# Patient Record
Sex: MALE | Race: WHITE | URBAN - METROPOLITAN AREA
[De-identification: names, ages, dates, MRNs, and addresses within clinical notes are randomized per-mention and may not be internally consistent; named-entity substitution may affect disease eponyms.]

---

## 2022-03-24 ENCOUNTER — OFFICE (OUTPATIENT)
Dept: URBAN - METROPOLITAN AREA CLINIC 4 | Facility: CLINIC | Age: 27
End: 2022-03-24

## 2022-03-24 VITALS — HEIGHT: 70 IN | WEIGHT: 233 LBS | DIASTOLIC BLOOD PRESSURE: 78 MMHG | SYSTOLIC BLOOD PRESSURE: 120 MMHG

## 2022-03-24 DIAGNOSIS — R15.2 FECAL URGENCY: ICD-10-CM

## 2022-03-24 DIAGNOSIS — R19.4 CHANGE IN BOWEL HABIT: ICD-10-CM

## 2022-03-24 DIAGNOSIS — R14.0 ABDOMINAL DISTENSION (GASEOUS): ICD-10-CM

## 2022-03-24 DIAGNOSIS — R19.7 DIARRHEA, UNSPECIFIED: ICD-10-CM

## 2022-03-24 DIAGNOSIS — R10.84 GENERALIZED ABDOMINAL PAIN: ICD-10-CM

## 2022-03-24 PROCEDURE — 99204 OFFICE O/P NEW MOD 45 MIN: CPT | Performed by: NURSE PRACTITIONER

## 2022-03-24 RX ORDER — DICYCLOMINE HYDROCHLORIDE 10 MG/1
30 CAPSULE ORAL
Qty: 90 | Refills: 5 | Status: ACTIVE
Start: 2022-03-24

## 2024-11-01 ENCOUNTER — OFFICE VISIT (OUTPATIENT)
Dept: INTERNAL MEDICINE | Facility: CLINIC | Age: 29
End: 2024-11-01
Payer: COMMERCIAL

## 2024-11-01 VITALS
HEIGHT: 70 IN | HEART RATE: 90 BPM | BODY MASS INDEX: 31.41 KG/M2 | SYSTOLIC BLOOD PRESSURE: 104 MMHG | TEMPERATURE: 97.3 F | DIASTOLIC BLOOD PRESSURE: 69 MMHG | OXYGEN SATURATION: 97 % | WEIGHT: 219.4 LBS

## 2024-11-01 DIAGNOSIS — Z23 NEED FOR INFLUENZA VACCINATION: ICD-10-CM

## 2024-11-01 DIAGNOSIS — Z11.59 ENCOUNTER FOR HEPATITIS C SCREENING TEST FOR LOW RISK PATIENT: ICD-10-CM

## 2024-11-01 DIAGNOSIS — J30.2 SEASONAL ALLERGIES: ICD-10-CM

## 2024-11-01 DIAGNOSIS — Z00.00 ENCOUNTER FOR MEDICAL EXAMINATION TO ESTABLISH CARE: Primary | ICD-10-CM

## 2024-11-01 PROCEDURE — 99203 OFFICE O/P NEW LOW 30 MIN: CPT

## 2024-11-01 PROCEDURE — 90471 IMMUNIZATION ADMIN: CPT

## 2024-11-01 PROCEDURE — 90656 IIV3 VACC NO PRSV 0.5 ML IM: CPT

## 2024-11-01 RX ORDER — DEXTROAMPHETAMINE SACCHARATE, AMPHETAMINE ASPARTATE, DEXTROAMPHETAMINE SULFATE, AND AMPHETAMINE SULFATE 5; 5; 5; 5 MG/1; MG/1; MG/1; MG/1
TABLET ORAL
COMMUNITY
Start: 2024-07-01

## 2024-11-01 RX ORDER — DEXTROAMPHETAMINE SACCHARATE, AMPHETAMINE ASPARTATE MONOHYDRATE, DEXTROAMPHETAMINE SULFATE AND AMPHETAMINE SULFATE 3.75; 3.75; 3.75; 3.75 MG/1; MG/1; MG/1; MG/1
25 CAPSULE, EXTENDED RELEASE ORAL EVERY MORNING
COMMUNITY
Start: 2024-08-09 | End: 2024-11-01 | Stop reason: SDUPTHER

## 2024-11-01 RX ORDER — DEXTROAMPHETAMINE SACCHARATE, AMPHETAMINE ASPARTATE, DEXTROAMPHETAMINE SULFATE AND AMPHETAMINE SULFATE 3.75; 3.75; 3.75; 3.75 MG/1; MG/1; MG/1; MG/1
20 TABLET ORAL DAILY
COMMUNITY
Start: 2024-07-18 | End: 2024-11-01 | Stop reason: SDUPTHER

## 2024-11-01 RX ORDER — DEXTROAMPHETAMINE SACCHARATE, AMPHETAMINE ASPARTATE MONOHYDRATE, DEXTROAMPHETAMINE SULFATE AND AMPHETAMINE SULFATE 6.25; 6.25; 6.25; 6.25 MG/1; MG/1; MG/1; MG/1
25 CAPSULE, EXTENDED RELEASE ORAL
COMMUNITY
Start: 2024-08-01

## 2024-11-01 NOTE — PATIENT INSTRUCTIONS
Continue medications as prescribed. Stay hydrated with water. Increase activity as tolerated. Labs in the future. Follow-up in 3 months for annual and fasting labs.

## 2024-11-01 NOTE — PROGRESS NOTES
"Chief Complaint  Establish Care and Allergies    Subjective        Elliot Ayan Bhat presents to [facility] PRIMARY [facility]ARE  History of Present Illness  29-year-old male presenting with seasonal allergies and to establish care.  Went to the [employer] for college and has been back in to [facility] for the past 2-1/2 years.  He is  and has 2 dogs.  Works as a  and helps make high-pressure torque tools.  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    Has seasonal allergies and occasionally takes Zyrtec.  Also takes occasionally a multivitamin.    Exercises routinely by walking his dogs.  Otherwise is on his feet 10 hours a day at work.    Sees Dr. Saldana for ADHD and is prescribed Adderall.  Since starting Adderall has had a 30 pound weight loss.  Weight is currently stabilizing due to high-protein and increased caloric intake.  Goal weight of 200 pounds.    Paternal side of family has alcohol abuse and drug abuse as well as heart history.  Maternal side of family has alcohol abuse, drug abuse and breast cancer history.      Was having irregular bowel movements but have since been improving.  Was previously checked by gastroenterology for Crohn's and celiac.        Objective   Vital Signs:  /69 (BP Location: Left arm, Patient Position: Sitting, Cuff Size: Adult)   Pulse 90   Temp 97.3 °F (36.3 °C) (Temporal)   Ht 177.8 cm (70\")   Wt 99.5 kg (219 lb 6.4 oz)   SpO2 97%   BMI 31.48 kg/m²   Estimated body mass index is 31.48 kg/m² as calculated from the following:    Height as of this encounter: 177.8 cm (70\").    Weight as of this encounter: 99.5 kg (219 lb 6.4 oz).       BMI is >= 30 and <35. (Class 1 Obesity). The following options were offered after discussion;: exercise counseling/recommendations and nutrition counseling/recommendations      Physical Exam  Vitals reviewed.   Constitutional:       Appearance: Normal appearance.   Musculoskeletal:         " General: Normal range of motion.      Cervical back: Normal range of motion.   Skin:     General: Skin is warm and dry.      Capillary Refill: Capillary refill takes less than 2 seconds.   Neurological:      General: No focal deficit present.      Mental Status: He is alert and oriented to person, place, and time.   Psychiatric:         Mood and Affect: Mood normal.         Behavior: Behavior normal.         Thought Content: Thought content normal.         Judgment: Judgment normal.        Result Review :            Current Outpatient Medications on File Prior to Visit   Medication Sig Dispense Refill    Adderall 20 MG tablet       amphetamine-dextroamphetamine XR (ADDERALL XR) 25 MG 24 hr capsule 1 capsule       No current facility-administered medications on file prior to visit.                Assessment and Plan     Diagnoses and all orders for this visit:    1. Encounter for medical examination to establish care (Primary)  -     Comprehensive Metabolic Panel; Future  -     Urinalysis With Microscopic If Indicated (No Culture) - Urine, Clean Catch; Future  -     Lipid Panel With / Chol / HDL Ratio; Future  -     TSH Rfx On Abnormal To Free T4; Future  -     CBC & Differential; Future    2. Seasonal allergies    3. Encounter for hepatitis C screening test for low risk patient  -     Hepatitis C Antibody; Future    4. Need for influenza vaccination  -     Fluzone >6mos        Patient Instructions   Continue medications as prescribed. Stay hydrated with water. Increase activity as tolerated. Labs in the future. Follow-up in 3 months for annual and fasting labs.            Follow Up     Return in about 3 months (around 2/1/2025) for Annual physical.  Patient was given instructions and counseling regarding his condition or for health maintenance advice. Please see specific information pulled into the AVS if appropriate.

## 2024-11-15 ENCOUNTER — TELEPHONE (OUTPATIENT)
Dept: INTERNAL MEDICINE | Facility: CLINIC | Age: 29
End: 2024-11-15
Payer: COMMERCIAL

## 2025-01-09 ENCOUNTER — OFFICE VISIT (OUTPATIENT)
Dept: INTERNAL MEDICINE | Facility: CLINIC | Age: 30
End: 2025-01-09
Payer: COMMERCIAL

## 2025-01-09 VITALS
TEMPERATURE: 97.1 F | DIASTOLIC BLOOD PRESSURE: 70 MMHG | WEIGHT: 212 LBS | SYSTOLIC BLOOD PRESSURE: 108 MMHG | OXYGEN SATURATION: 98 % | BODY MASS INDEX: 30.35 KG/M2 | HEART RATE: 87 BPM | HEIGHT: 70 IN

## 2025-01-09 DIAGNOSIS — J40 BRONCHITIS: ICD-10-CM

## 2025-01-09 DIAGNOSIS — H93.8X3 CONGESTION OF BOTH EARS: ICD-10-CM

## 2025-01-09 DIAGNOSIS — J06.9 UPPER RESPIRATORY TRACT INFECTION, UNSPECIFIED TYPE: Primary | ICD-10-CM

## 2025-01-09 LAB
EXPIRATION DATE: NORMAL
FLUAV AG UPPER RESP QL IA.RAPID: NOT DETECTED
FLUBV AG UPPER RESP QL IA.RAPID: NOT DETECTED
INTERNAL CONTROL: NORMAL
Lab: NORMAL
SARS-COV-2 AG UPPER RESP QL IA.RAPID: NOT DETECTED

## 2025-01-09 PROCEDURE — 87428 SARSCOV & INF VIR A&B AG IA: CPT

## 2025-01-09 PROCEDURE — 99213 OFFICE O/P EST LOW 20 MIN: CPT

## 2025-01-09 RX ORDER — METHYLPREDNISOLONE 4 MG/1
TABLET ORAL
Qty: 21 TABLET | Refills: 0 | Status: SHIPPED | OUTPATIENT
Start: 2025-01-09

## 2025-01-09 RX ORDER — AZITHROMYCIN 250 MG/1
TABLET, FILM COATED ORAL
Qty: 6 TABLET | Refills: 0 | Status: SHIPPED | OUTPATIENT
Start: 2025-01-09

## 2025-01-09 NOTE — PROGRESS NOTES
Chief Complaint  Fatigue (X5 days with some improvement ), Eye Pain (X5 days with some improvement ), Sore Throat (X5 days with no improvement ), Ear Fullness, and Cough (X5 days with no improvement )     Subjective:      History of Present Illness {CC  Problem List  Visit  Diagnosis   Encounters  Notes  Medications  Labs  Result Review Imaging  Media :23}     Elliot Bhat presents to [facility] for:    Patient or patient representative verbalized consent for the use of Ambient Listening during the visit with  FAITH Bautista for chart documentation. 1/22/2025  16:07 EST     History of Present Illness      The patient presents for evaluation of a cold.    He reports a gradual improvement in his symptoms but expresses concern over the prolonged duration of his cold, which he has never experienced before. He has not sought urgent care for this issue. His symptoms began last Saturday during a trip to [place] and have since fluctuated, with some improving and others worsening. He continues to experience rhinorrhea, pharyngeal discomfort, and a sensation of ear fullness. He has been expectorating green sputum, which he attributes to postnasal drip, although his nasal discharge remains clear. He experiences mild wheezing when supine at night and occasionally towards the end of the day.  His pharyngeal symptoms vary, sometimes improving and other times worsening. He has no history of recent lymphadenopathy or tenderness. He has a past medical history of streptococcal pharyngitis in his youth, which necessitated a tonsillectomy. He has abstained from Adderall for several days due to his current illness. He has been self-medicating with DayQuil and NyQuil but has avoided decongestants due to potential interactions with Adderall. He has been using allergy medications intermittently for the past few weeks.    ALLERGIES  The patient is allergic to  PENICILLINS.    MEDICATIONS  Current: Adderall (25 mg extended release in the morning and 20 mg release later in the afternoon)  Past: DayQuil, NyQuil         I have reviewed patient's medical history, any new submitted information provided by patient or medical assistant and updated medical record.      Objective:      Physical Exam  Vitals reviewed.   Constitutional:       General: He is awake. He is not in acute distress.     Appearance: Normal appearance. He is well-groomed. He is not ill-appearing, toxic-appearing or diaphoretic.   HENT:      Head: Normocephalic.      Right Ear: Hearing normal. Drainage and tenderness present. Tympanic membrane is not bulging. Tympanic membrane has normal mobility.      Left Ear: Hearing normal. Drainage and tenderness present. Tympanic membrane is not bulging. Tympanic membrane has normal mobility.      Nose: Nose normal. Congestion and rhinorrhea present.      Right Sinus: No maxillary sinus tenderness or frontal sinus tenderness.      Left Sinus: No maxillary sinus tenderness or frontal sinus tenderness.      Mouth/Throat:      Lips: Pink.      Mouth: Mucous membranes are moist.      Pharynx: Oropharynx is clear. Uvula midline.      Tonsils: No tonsillar exudate or tonsillar abscesses.   Eyes:      General: Lids are normal. Vision grossly intact. Allergic shiner present.      Conjunctiva/sclera: Conjunctivae normal.   Cardiovascular:      Rate and Rhythm: Normal rate and regular rhythm.      Heart sounds: Normal heart sounds.   Pulmonary:      Effort: Pulmonary effort is normal.      Breath sounds: No stridor or transmitted upper airway sounds. Examination of the right-lower field reveals decreased breath sounds. Examination of the left-lower field reveals decreased breath sounds. Decreased breath sounds present. No wheezing, rhonchi or rales.   Abdominal:      General: Bowel sounds are normal.   Lymphadenopathy:      Head:      Right side of head: No submental,  "submandibular or tonsillar adenopathy.      Left side of head: No submental, submandibular or tonsillar adenopathy.      Cervical: No cervical adenopathy.      Right cervical: No superficial, deep or posterior cervical adenopathy.     Left cervical: No superficial, deep or posterior cervical adenopathy.   Skin:     General: Skin is warm and dry.      Capillary Refill: Capillary refill takes less than 2 seconds.   Neurological:      General: No focal deficit present.      Mental Status: He is oriented to person, place, and time. Mental status is at baseline. He is lethargic.   Psychiatric:         Attention and Perception: Attention and perception normal.         Mood and Affect: Mood and affect normal.         Speech: Speech normal.         Behavior: Behavior normal. Behavior is cooperative.         Cognition and Memory: Cognition and memory normal.         Judgment: Judgment normal.        Result Review  Data Reviewed:{ Labs  Result Review  Imaging  Med Tab  Media :23}     Results  Laboratory Studies  Covid and flu tests are negative.       The following data was reviewed by: FAITH Bautista on 01/09/2025         POCT SARS-CoV-2 + Flu Antigen DENIS (01/09/2025 4:13 PM)       Vital Signs:   /70 (BP Location: Left arm, Patient Position: Sitting, Cuff Size: Adult)   Pulse 87   Temp 97.1 °F (36.2 °C) (Temporal)   Ht 177.8 cm (70\")   Wt 96.2 kg (212 lb)   SpO2 98%   BMI 30.42 kg/m²                   Requested Prescriptions     Signed Prescriptions Disp Refills    azithromycin (ZITHROMAX) 250 MG tablet 6 tablet 0     Sig: Take 2 tablets the first day, then 1 tablet daily for 4 days.    methylPREDNISolone (MEDROL) 4 MG dose pack 21 tablet 0     Sig: Take as directed on package instructions.       Routine medications provided by this office will also be refilled via pharmacy request.       Current Outpatient Medications:     Adderall 20 MG tablet, , Disp: , Rfl:     amphetamine-dextroamphetamine XR " (ADDERALL XR) 25 MG 24 hr capsule, 1 capsule, Disp: , Rfl:     azithromycin (ZITHROMAX) 250 MG tablet, Take 2 tablets the first day, then 1 tablet daily for 4 days., Disp: 6 tablet, Rfl: 0    methylPREDNISolone (MEDROL) 4 MG dose pack, Take as directed on package instructions., Disp: 21 tablet, Rfl: 0     Assessment and Plan:      Assessment and Plan {CC Problem List  Visit Diagnosis  ROS  Review (Popup)  [facility] Maintenance  Quality  BestPractice  Medications  SmartSets  SnapShot Encounters  Media :23}     Problem List Items Addressed This Visit    None  Visit Diagnoses       Upper respiratory tract infection, unspecified type    -  Primary    Relevant Medications    azithromycin (ZITHROMAX) 250 MG tablet    Congestion of both ears        Relevant Orders    POCT SARS-CoV-2 + Flu Antigen DENIS (Completed)    Bronchitis                     1. Upper respiratory infection.  COVID-19 and influenza tests returned negative results. The wheezing is likely due to inflammation and irritation rather than a chest infection. A Medrol Dosepak will be prescribed to manage inflammation and wheezing. Additionally, a Z-Blake will be provided to address the upper respiratory infection. He is advised to complete both courses of medication, even if symptoms improve. Potential side effects of the Medrol Dosepak, including increased appetite, irritability, and occasional heart palpitations, were discussed. He is encouraged to maintain adequate hydration during this period. The Z-Blake should be taken with food to prevent gastrointestinal upset.  Over-the-counter medications such as Mucinex or DayQuil can be used, but caution is advised when combining these with Tylenol or ibuprofen. If symptoms persist, a follow-up appointment will be scheduled for further evaluation, including a potential chest x-ray.    PROCEDURE  The patient underwent a tonsillectomy in the past due to streptococcal pharyngitis.   Upper Respiratory Infection      An upper respiratory infection is also called a common cold. It can affect your nose, throat, ears, and sinuses.   Common signs and symptoms include the following: Cold symptoms are usually worst for the first 3 to 5 days. You may have any of the following:  · Runny or stuffy nose  · Sneezing and coughing  · Sore throat or hoarseness  · Red, watery, and sore eyes  · Fatigue   · Chills and fever  · Headache, body aches, or sore muscles    Seek care immediately if:   · You have chest pain or trouble breathing.    Contact your healthcare provider if:   · You have a fever over 102ºF (39°C).  · Your sore throat gets worse or you see white or yellow spots in your throat.  · Your symptoms get worse after 3 to 5 days or your cold is not better in 14 days.  · You have a rash anywhere on your skin.  · You have large, tender lumps in your neck.  · You have thick, green or yellow drainage from your nose.  · You cough up thick yellow, green, or bloody mucus.  · You have vomiting for more than 24 hours and cannot keep fluids down.  · You have a bad earache.  · You have questions or concerns about your condition or care.    Treatment for a cold: There is no cure for the common cold. Colds are caused by viruses and do not get better with antibiotics. Most people get better in 7 to 14 days. You may continue to cough for 2 to 3 weeks. The following may help decrease your symptoms:  · Decongestants help reduce nasal congestion and help you breathe more easily. If you take decongestant pills, they may make you feel restless or not able to sleep. Do not use decongestant sprays for more than a few days.    · Cough suppressants help reduce coughing. Ask your healthcare provider which type of cough medicine is best for you.     · NSAIDs , such as ibuprofen, help decrease swelling, pain, and fever. NSAIDs can cause stomach bleeding or kidney problems in certain people. If you take blood thinner medicine, always ask your healthcare  provider if NSAIDs are safe for you. Always read the medicine label and follow directions.    · Acetaminophen decreases pain and fever. It is available without a doctor's order. Ask how much to take and how often to take it. Follow directions. Read the labels of all other medicines you are using to see if they also contain acetaminophen, or ask your doctor or pharmacist. Acetaminophen can cause liver damage if not taken correctly. Do not use more than 4 grams (4,000 milligrams) total of acetaminophen in one day.    Manage your cold:   · Rest as much as possible. Slowly start to do more each day.     · Drink more liquids as directed. Liquids will help thin and loosen mucus so you can cough it up. Liquids will also help prevent dehydration. Liquids that help prevent dehydration include water, fruit juice, and broth. Do not drink liquids that contain caffeine. Caffeine can increase your risk for dehydration. Ask your healthcare provider how much liquid to drink each day.     · Soothe a sore throat. Gargle with warm salt water. This helps your sore throat feel better. Make salt water by dissolving ¼ teaspoon salt in 1 cup warm water. You may also suck on hard candy or throat lozenges. You may use a sore throat spray.    · Use a humidifier or vaporizer. Use a cool mist humidifier or a vaporizer to increase air moisture in your home. This may make it easier for you to breathe and help decrease your cough.     · Use saline nasal drops as directed. These help relieve congestion.     · Apply petroleum-based jelly around the outside of your nostrils. This can decrease irritation from blowing your nose.     · Do not smoke. Nicotine and other chemicals in cigarettes and cigars can make your symptoms worse. They can also cause infections such as bronchitis or pneumonia. Ask your healthcare provider for information if you currently smoke and need help to quit. E-cigarettes or smokeless tobacco still contain nicotine. Talk to your  healthcare provider before you use these products.    Prevent spreading your cold to others:   · Try to stay away from other people during the first 2 to 3 days of your cold when it is more easily spread.   · Do not share food or drinks.   · Do not share hand towels with household members.  · Wash your hands often, especially after you blow your nose. Turn away from other people and cover your mouth and nose with a tissue when you sneeze or cough.     Thank you for allowing us to care for you,  FAITH Bautista      Follow Up {Instructions Charge Capture  Follow-up Communications :23}     No follow-ups on file.      Patient was given instructions and counseling regarding his condition or for health maintenance advice. Please see specific information pulled into the AVS if appropriate.        Dragon disclaimer:   Much of this encounter note is an electronic transcription/translation of spoken language to printed text. The electronic translation of spoken language may permit erroneous, or at times, nonsensical words or phrases to be inadvertently transcribed; Although I have reviewed the note for such errors, some may still exist.     Additional Patient Counseling:       Patient Instructions     Clinical References    Methylprednisolone Tablets  What is this medication?  METHYLPREDNISOLONE (meth ill pred NISS oh lone) treats many conditions such as asthma, allergic reactions, arthritis, inflammatory bowel diseases, adrenal, and blood or bone marrow disorders. It works by decreasing inflammation, slowing down an overactive immune system, or replacing cortisol normally made in the body. Cortisol is a hormone that plays an important role in how the body responds to stress, illness, and injury. It belongs to a group of medications called steroids.  This medicine may be used for other purposes; ask your health care provider or pharmacist if you have questions.  COMMON BRAND NAME(S): Medrol, Medrol Dosepak  What  should I tell my care team before I take this medication?  They need to know if you have any of these conditions:  Cushing's syndrome  Eye disease, vision problems  Diabetes  Glaucoma  Heart disease  High blood pressure  Infection especially a viral infection, such as chickenpox, cold sores, or herpes  Liver disease  Mental health conditions  Myasthenia gravis  Osteoporosis  Recent or upcoming vaccine  Seizures  Stomach or intestine problems  Thyroid disease  An unusual or allergic reaction to lactose, methylprednisolone, other medications, foods, dyes, or preservatives  Pregnant or trying to get pregnant  Breastfeeding  How should I use this medication?  Take this medication by mouth with a glass of water. Follow the directions on the prescription label. Take this medication with food. If you are taking this medication once a day, take it in the morning. Do not take it more often than directed. Do not suddenly stop taking your medication because you may develop a severe reaction. Your care team will tell you how much medication to take. If your care team wants you to stop the medication, the dose may be slowly lowered over time to avoid any side effects.  Talk to your care team about the use of this medication in children. Special care may be needed.  Overdosage: If you think you have taken too much of this medicine contact a poison control center or emergency room at once.  NOTE: This medicine is only for you. Do not share this medicine with others.  What if I miss a dose?  If you miss a dose, take it as soon as you can. If it is almost time for your next dose, talk to your care team. You may need to miss a dose or take an extra dose. Do not take double or extra doses without advice.  What may interact with this medication?  Do not take this medication with any of the following:  Alefacept  Echinacea  Live virus vaccines  Metyrapone  Mifepristone  This medication may also interact with the following:  Amphotericin  B  Aspirin and aspirin-like medications  Certain antibiotics, such as erythromycin, clarithromycin, troleandomycin  Certain medications for diabetes  Certain medications for fungal infections, such as ketoconazole  Certain medications for seizures, such as carbamazepine, phenobarbital, phenytoin  Certain medications that treat or prevent blood clots, such as warfarin  Cholestyramine  Cyclosporine  Digoxin  Diuretics  Estrogen or progestin hormones  Isoniazid  NSAIDs, medications for pain and inflammation, such as ibuprofen or naproxen  Other medications for myasthenia gravis  Rifampin  Vaccines  This list may not describe all possible interactions. Give your health care provider a list of all the medicines, herbs, non-prescription drugs, or dietary supplements you use. Also tell them if you smoke, drink alcohol, or use illegal drugs. Some items may interact with your medicine.  What should I watch for while using this medication?  Tell your care team if your symptoms do not start to get better or if they get worse. Do not stop taking except on your care team's advice. You may develop a severe reaction. Your care team will tell you how much medication to take.  This medication may increase your risk of getting an infection. Tell your care team if you are around anyone with measles or chickenpox, or if you develop sores or blisters that do not heal properly.  This medication may increase blood sugar levels. Ask your care team if changes in diet or medications are needed if you have diabetes.  Tell your care team right away if you have any change in your eyesight.  Using this medication for a long time may increase your risk of low bone mass. Talk to your care team about bone health.  What side effects may I notice from receiving this medication?  Side effects that you should report to your care team as soon as possible:  Allergic reactions--skin rash, itching, hives, swelling of the face, lips, tongue, or  throat  Cushing syndrome--increased fat around the midsection, upper back, neck, or face, pink or purple stretch marks on the skin, thinning, fragile skin that easily bruises, unexpected hair growth  High blood sugar (hyperglycemia)--increased thirst or amount of urine, unusual weakness or fatigue, blurry vision  Increase in blood pressure  Infection--fever, chills, cough, sore throat, wounds that don't heal, pain or trouble when passing urine, general feeling of discomfort or being unwell  Low adrenal gland function--nausea, vomiting, loss of appetite, unusual weakness or fatigue, dizziness  Mood and behavior changes--anxiety, nervousness, confusion, hallucinations, irritability, hostility, thoughts of suicide or self-harm, worsening mood, feelings of depression  Stomach bleeding--bloody or black, tar-like stools, vomiting blood or brown material that looks like coffee grounds  Swelling of the ankles, hands, or feet  Side effects that usually do not require medical attention (report to your care team if they continue or are bothersome):  Acne  General discomfort and fatigue  Headache  Increase in appetite  Nausea  Trouble sleeping  Weight gain  This list may not describe all possible side effects. Call your doctor for medical advice about side effects. You may report side effects to FDA at 6-043-FDA-3310.  Where should I keep my medication?  Keep out of the reach of children and pets.  Store at room temperature between 20 and 25 degrees C (68 and 77 degrees F). Throw away any unused medication after the expiration date.  NOTE: This sheet is a summary. It may not cover all possible information. If you have questions about this medicine, talk to your doctor, pharmacist, or health care provider.  © 2024 [employer] Standard (2023-08-16 00:00:00)  Azithromycin Tablets  What is this medication?  AZITHROMYCIN (az ith ady MYE sin) treats infections caused by bacteria. It belongs to a group of medications called  antibiotics. It will not treat colds, the flu, or infections caused by viruses.  This medicine may be used for other purposes; ask your health care provider or pharmacist if you have questions.  COMMON BRAND NAME(S): Zithromax, Zithromax Tri-Blake, Zithromax Z-Blake  What should I tell my care team before I take this medication?  They need to know if you have any of these conditions:  History of blood diseases, such as leukemia  History of irregular heartbeat  Kidney disease  Liver disease  Myasthenia gravis  An unusual or allergic reaction to azithromycin, other medications, foods, dyes, or preservatives  Pregnant or trying to get pregnant  Breastfeeding  How should I use this medication?  Take this medication by mouth with a full glass of water. Take it as directed on the prescription label. You can take it with food or on an empty stomach. If it upsets your stomach, take it with food. Take your medication at regular intervals. Do not take your medication more often than directed. Take all of your medication unless your care team tells you to stop it early. Keep taking it even if you think you are better.  Talk to your care team about the use of this medication in children. While it may be prescribed for children for selected conditions, precautions do apply.  Overdosage: If you think you have taken too much of this medicine contact a poison control center or emergency room at once.  NOTE: This medicine is only for you. Do not share this medicine with others.  What if I miss a dose?  If you miss a dose, take it as soon as you can. If it is almost time for your next dose, take only that dose. Do not take double or extra doses.  What may interact with this medication?  Do not take this medication with any of the following:  Cisapride  Dronedarone  Pimozide  Thioridazine  This medication may also interact with the following:  Antacids that contain aluminum or magnesium  Colchicine  Cyclosporine  Digoxin  Ergot alkaloids,  such as dihydroergotamine, ergotamine  Estrogen or progestin hormones  Nelfinavir  Other medications that cause heart rhythm change  Phenytoin  Warfarin  This list may not describe all possible interactions. Give your health care provider a list of all the medicines, herbs, non-prescription drugs, or dietary supplements you use. Also tell them if you smoke, drink alcohol, or use illegal drugs. Some items may interact with your medicine.  What should I watch for while using this medication?  Tell your care team if your symptoms do not start to get better or if they get worse.  This medication may cause serious skin reactions. They can happen weeks to months after starting the medication. Contact your care team right away if you notice fevers or flu-like symptoms with a rash. The rash may be red or purple and then turn into blisters or peeling of the skin. Or, you might notice a red rash with swelling of the face, lips or lymph nodes in your neck or under your arms.  Do not treat diarrhea with over the counter products. Contact your care team if you have diarrhea that lasts more than 2 days or if it is severe and watery.  This medication can make you more sensitive to the sun. Keep out of the sun. If you cannot avoid being in the sun, wear protective clothing and use sunscreen. Do not use sun lamps or tanning beds/booths.  What side effects may I notice from receiving this medication?  Side effects that you should report to your care team as soon as possible:  Allergic reactions or angioedema--skin rash, itching, hives, swelling of the face, eyes, lips, tongue, arms, or legs, trouble swallowing or breathing  Heart rhythm changes--fast or irregular heartbeat, dizziness, feeling faint or lightheaded, chest pain, trouble breathing  Liver injury--right upper belly pain, loss of appetite, nausea, light-colored stool, dark yellow or brown urine, yellowing skin or eyes, unusual weakness or fatigue  Rash, fever, and swollen  lymph nodes  Redness, blistering, peeling, or loosening of the skin, including inside the mouth  Severe diarrhea, fever  Unusual vaginal discharge, itching, or odor  Side effects that usually do not require medical attention (report to your care team if they continue or are bothersome):  Diarrhea  Nausea  Stomach pain  Vomiting  This list may not describe all possible side effects. Call your doctor for medical advice about side effects. You may report side effects to FDA at 7-933-EUV-9251.  Where should I keep my medication?  Keep out of the reach of children and pets.  Store at room temperature between 15 and 30 degrees C (59 and 86 degrees F). Throw away any unused medication after the expiration date.  NOTE: This sheet is a summary. It may not cover all possible information. If you have questions about this medicine, talk to your doctor, pharmacist, or health care provider.  © 2024 [employer] (2023-09-08 00:00:00)  Upper Respiratory Infection, Adult  An upper respiratory infection (URI) affects the nose, throat, and upper airways that lead to the lungs. The most common type of URI is often called the common cold. URIs usually get better on their own, without medical treatment.  What are the causes?  A URI is caused by a germ (virus). You may catch these germs by:  Breathing in droplets from an infected person's cough or sneeze.  Touching something that has the germ on it (is contaminated) and then touching your mouth, nose, or eyes.  What increases the risk?  You are more likely to get a URI if:  You are very young or very old.  You have close contact with others, such as at work, school, or a health care facility.  You smoke.  You have long-term (chronic) heart or lung disease.  You have a weakened disease-fighting system (immune system).  You have nasal allergies or asthma.  You have a lot of stress.  You have poor nutrition.  What are the signs or symptoms?  Runny or stuffy (congested)  nose.  Cough.  Sneezing.  Sore throat.  Headache.  Feeling tired (fatigue).  Fever.  Not wanting to eat as much as usual.  Pain in your forehead, behind your eyes, and over your cheekbones (sinus pain).  Muscle aches.  Redness or irritation of the eyes.  Pressure in the ears or face.  How is this treated?  URIs usually get better on their own within 7-10 days. Medicines cannot cure URIs, but your doctor may recommend certain medicines to help relieve symptoms, such as:  Over-the-counter cold medicines.  Medicines to reduce coughing (cough suppressants). Coughing is a type of defense against infection that helps to clear the nose, throat, windpipe, and lungs (respiratory system). Take these medicines only as told by your doctor.  Medicines to lower your fever.  Follow these instructions at home:  Activity  Rest as needed.  If you have a fever, stay home from work or school until your fever is gone, or until your doctor says you may return to work or school.  You should stay home until you cannot spread the infection anymore (you are not contagious).  Your doctor may have you wear a face mask so you have less risk of spreading the infection.  Relieving symptoms  Rinse your mouth often with salt water. To make salt water, dissolve ½-1 tsp (3-6 g) of salt in 1 cup (237 mL) of warm water.  Use a cool-mist humidifier to add moisture to the air. This can help you breathe more easily.  Eating and drinking    Drink enough fluid to keep your pee (urine) pale yellow.  Eat soups and other clear broths.  General instructions    Take over-the-counter and prescription medicines only as told by your doctor.  Do not smoke or use any products that contain nicotine or tobacco. If you need help quitting, ask your doctor.  Avoid being where people are smoking (avoid secondhand smoke).  Stay up to date on all your shots (immunizations), and get the flu shot every year.  Keep all follow-up visits.  How to prevent the spread of infection  to others    Wash your hands with soap and water for at least 20 seconds. If you cannot use soap and water, use hand .  Avoid touching your mouth, face, eyes, or nose.  Cough or sneeze into a tissue or your sleeve or elbow. Do not cough or sneeze into your hand or into the air.  Contact a doctor if:  You are getting worse, not better.  You have any of these:  A fever or chills.  Brown or red mucus in your nose.  Yellow or brown fluid (discharge)coming from your nose.  Pain in your face, especially when you bend forward.  Swollen neck glands.  Pain when you swallow.  White areas in the back of your throat.  Get help right away if:  You have shortness of breath that gets worse.  You have very bad or constant:  Headache.  Ear pain.  Pain in your forehead, behind your eyes, and over your cheekbones (sinus pain).  Chest pain.  You have long-lasting (chronic) lung disease along with any of these:  Making high-pitched whistling sounds when you breathe, most often when you breathe out (wheezing).  Long-lasting cough (more than 14 days).  Coughing up blood.  A change in your usual mucus.  You have a stiff neck.  You have changes in your:  Vision.  Hearing.  Thinking.  Mood.  These symptoms may be an emergency. Get help right away. Call 911.  Do not wait to see if the symptoms will go away.  Do not drive yourself to the hospital.  Summary  An upper respiratory infection (URI) is caused by a germ (virus). The most common type of URI is often called the common cold.  URIs usually get better within 7-10 days.  Take over-the-counter and prescription medicines only as told by your doctor.  This information is not intended to replace advice given to you by your health care provider. Make sure you discuss any questions you have with your health care provider.  Document Revised: 07/20/2022 Document Reviewed: 07/20/2022

## 2025-01-23 NOTE — PATIENT INSTRUCTIONS
Clinical References    Methylprednisolone Tablets  What is this medication?  METHYLPREDNISOLONE (meth ill pred NISS oh lone) treats many conditions such as asthma, allergic reactions, arthritis, inflammatory bowel diseases, adrenal, and blood or bone marrow disorders. It works by decreasing inflammation, slowing down an overactive immune system, or replacing cortisol normally made in the body. Cortisol is a hormone that plays an important role in how the body responds to stress, illness, and injury. It belongs to a group of medications called steroids.  This medicine may be used for other purposes; ask your health care provider or pharmacist if you have questions.  COMMON BRAND NAME(S): Medrol, Medrol Dosepak  What should I tell my care team before I take this medication?  They need to know if you have any of these conditions:  Cushing's syndrome  Eye disease, vision problems  Diabetes  Glaucoma  Heart disease  High blood pressure  Infection especially a viral infection, such as chickenpox, cold sores, or herpes  Liver disease  Mental health conditions  Myasthenia gravis  Osteoporosis  Recent or upcoming vaccine  Seizures  Stomach or intestine problems  Thyroid disease  An unusual or allergic reaction to lactose, methylprednisolone, other medications, foods, dyes, or preservatives  Pregnant or trying to get pregnant  Breastfeeding  How should I use this medication?  Take this medication by mouth with a glass of water. Follow the directions on the prescription label. Take this medication with food. If you are taking this medication once a day, take it in the morning. Do not take it more often than directed. Do not suddenly stop taking your medication because you may develop a severe reaction. Your care team will tell you how much medication to take. If your care team wants you to stop the medication, the dose may be slowly lowered over time to avoid any side effects.  Talk to your care team about the use of this  medication in children. Special care may be needed.  Overdosage: If you think you have taken too much of this medicine contact a poison control center or emergency room at once.  NOTE: This medicine is only for you. Do not share this medicine with others.  What if I miss a dose?  If you miss a dose, take it as soon as you can. If it is almost time for your next dose, talk to your care team. You may need to miss a dose or take an extra dose. Do not take double or extra doses without advice.  What may interact with this medication?  Do not take this medication with any of the following:  Alefacept  Echinacea  Live virus vaccines  Metyrapone  Mifepristone  This medication may also interact with the following:  Amphotericin B  Aspirin and aspirin-like medications  Certain antibiotics, such as erythromycin, clarithromycin, troleandomycin  Certain medications for diabetes  Certain medications for fungal infections, such as ketoconazole  Certain medications for seizures, such as carbamazepine, phenobarbital, phenytoin  Certain medications that treat or prevent blood clots, such as warfarin  Cholestyramine  Cyclosporine  Digoxin  Diuretics  Estrogen or progestin hormones  Isoniazid  NSAIDs, medications for pain and inflammation, such as ibuprofen or naproxen  Other medications for myasthenia gravis  Rifampin  Vaccines  This list may not describe all possible interactions. Give your health care provider a list of all the medicines, herbs, non-prescription drugs, or dietary supplements you use. Also tell them if you smoke, drink alcohol, or use illegal drugs. Some items may interact with your medicine.  What should I watch for while using this medication?  Tell your care team if your symptoms do not start to get better or if they get worse. Do not stop taking except on your care team's advice. You may develop a severe reaction. Your care team will tell you how much medication to take.  This medication may increase your risk  of getting an infection. Tell your care team if you are around anyone with measles or chickenpox, or if you develop sores or blisters that do not heal properly.  This medication may increase blood sugar levels. Ask your care team if changes in diet or medications are needed if you have diabetes.  Tell your care team right away if you have any change in your eyesight.  Using this medication for a long time may increase your risk of low bone mass. Talk to your care team about bone health.  What side effects may I notice from receiving this medication?  Side effects that you should report to your care team as soon as possible:  Allergic reactions--skin rash, itching, hives, swelling of the face, lips, tongue, or throat  Cushing syndrome--increased fat around the midsection, upper back, neck, or face, pink or purple stretch marks on the skin, thinning, fragile skin that easily bruises, unexpected hair growth  High blood sugar (hyperglycemia)--increased thirst or amount of urine, unusual weakness or fatigue, blurry vision  Increase in blood pressure  Infection--fever, chills, cough, sore throat, wounds that don't heal, pain or trouble when passing urine, general feeling of discomfort or being unwell  Low adrenal gland function--nausea, vomiting, loss of appetite, unusual weakness or fatigue, dizziness  Mood and behavior changes--anxiety, nervousness, confusion, hallucinations, irritability, hostility, thoughts of suicide or self-harm, worsening mood, feelings of depression  Stomach bleeding--bloody or black, tar-like stools, vomiting blood or brown material that looks like coffee grounds  Swelling of the ankles, hands, or feet  Side effects that usually do not require medical attention (report to your care team if they continue or are bothersome):  Acne  General discomfort and fatigue  Headache  Increase in appetite  Nausea  Trouble sleeping  Weight gain  This list may not describe all possible side effects. Call your  doctor for medical advice about side effects. You may report side effects to FDA at 5-707-WPR-5558.  Where should I keep my medication?  Keep out of the reach of children and pets.  Store at room temperature between 20 and 25 degrees C (68 and 77 degrees F). Throw away any unused medication after the expiration date.  NOTE: This sheet is a summary. It may not cover all possible information. If you have questions about this medicine, talk to your doctor, pharmacist, or health care provider.  © 2024 [employer]/Gold Standard (2023-08-16 00:00:00)  Azithromycin Tablets  What is this medication?  AZITHROMYCIN (az ith ady MYE sin) treats infections caused by bacteria. It belongs to a group of medications called antibiotics. It will not treat colds, the flu, or infections caused by viruses.  This medicine may be used for other purposes; ask your health care provider or pharmacist if you have questions.  COMMON BRAND NAME(S): Zithromax, Zithromax Tri-Blake, Zithromax Z-Blake  What should I tell my care team before I take this medication?  They need to know if you have any of these conditions:  History of blood diseases, such as leukemia  History of irregular heartbeat  Kidney disease  Liver disease  Myasthenia gravis  An unusual or allergic reaction to azithromycin, other medications, foods, dyes, or preservatives  Pregnant or trying to get pregnant  Breastfeeding  How should I use this medication?  Take this medication by mouth with a full glass of water. Take it as directed on the prescription label. You can take it with food or on an empty stomach. If it upsets your stomach, take it with food. Take your medication at regular intervals. Do not take your medication more often than directed. Take all of your medication unless your care team tells you to stop it early. Keep taking it even if you think you are better.  Talk to your care team about the use of this medication in children. While it may be prescribed for children for  selected conditions, precautions do apply.  Overdosage: If you think you have taken too much of this medicine contact a poison control center or emergency room at once.  NOTE: This medicine is only for you. Do not share this medicine with others.  What if I miss a dose?  If you miss a dose, take it as soon as you can. If it is almost time for your next dose, take only that dose. Do not take double or extra doses.  What may interact with this medication?  Do not take this medication with any of the following:  Cisapride  Dronedarone  Pimozide  Thioridazine  This medication may also interact with the following:  Antacids that contain aluminum or magnesium  Colchicine  Cyclosporine  Digoxin  Ergot alkaloids, such as dihydroergotamine, ergotamine  Estrogen or progestin hormones  Nelfinavir  Other medications that cause heart rhythm change  Phenytoin  Warfarin  This list may not describe all possible interactions. Give your health care provider a list of all the medicines, herbs, non-prescription drugs, or dietary supplements you use. Also tell them if you smoke, drink alcohol, or use illegal drugs. Some items may interact with your medicine.  What should I watch for while using this medication?  Tell your care team if your symptoms do not start to get better or if they get worse.  This medication may cause serious skin reactions. They can happen weeks to months after starting the medication. Contact your care team right away if you notice fevers or flu-like symptoms with a rash. The rash may be red or purple and then turn into blisters or peeling of the skin. Or, you might notice a red rash with swelling of the face, lips or lymph nodes in your neck or under your arms.  Do not treat diarrhea with over the counter products. Contact your care team if you have diarrhea that lasts more than 2 days or if it is severe and watery.  This medication can make you more sensitive to the sun. Keep out of the sun. If you cannot avoid  being in the sun, wear protective clothing and use sunscreen. Do not use sun lamps or tanning beds/booths.  What side effects may I notice from receiving this medication?  Side effects that you should report to your care team as soon as possible:  Allergic reactions or angioedema--skin rash, itching, hives, swelling of the face, eyes, lips, tongue, arms, or legs, trouble swallowing or breathing  Heart rhythm changes--fast or irregular heartbeat, dizziness, feeling faint or lightheaded, chest pain, trouble breathing  Liver injury--right upper belly pain, loss of appetite, nausea, light-colored stool, dark yellow or brown urine, yellowing skin or eyes, unusual weakness or fatigue  Rash, fever, and swollen lymph nodes  Redness, blistering, peeling, or loosening of the skin, including inside the mouth  Severe diarrhea, fever  Unusual vaginal discharge, itching, or odor  Side effects that usually do not require medical attention (report to your care team if they continue or are bothersome):  Diarrhea  Nausea  Stomach pain  Vomiting  This list may not describe all possible side effects. Call your doctor for medical advice about side effects. You may report side effects to FDA at 6-149-FDA-8750.  Where should I keep my medication?  Keep out of the reach of children and pets.  Store at room temperature between 15 and 30 degrees C (59 and 86 degrees F). Throw away any unused medication after the expiration date.  NOTE: This sheet is a summary. It may not cover all possible information. If you have questions about this medicine, talk to your doctor, pharmacist, or health care provider.  © 2024 [employer] (2023-09-08 00:00:00)  Upper Respiratory Infection, Adult  An upper respiratory infection (URI) affects the nose, throat, and upper airways that lead to the lungs. The most common type of URI is often called the common cold. URIs usually get better on their own, without medical treatment.  What are the causes?  A  URI is caused by a germ (virus). You may catch these germs by:  Breathing in droplets from an infected person's cough or sneeze.  Touching something that has the germ on it (is contaminated) and then touching your mouth, nose, or eyes.  What increases the risk?  You are more likely to get a URI if:  You are very young or very old.  You have close contact with others, such as at work, school, or a health care facility.  You smoke.  You have long-term (chronic) heart or lung disease.  You have a weakened disease-fighting system (immune system).  You have nasal allergies or asthma.  You have a lot of stress.  You have poor nutrition.  What are the signs or symptoms?  Runny or stuffy (congested) nose.  Cough.  Sneezing.  Sore throat.  Headache.  Feeling tired (fatigue).  Fever.  Not wanting to eat as much as usual.  Pain in your forehead, behind your eyes, and over your cheekbones (sinus pain).  Muscle aches.  Redness or irritation of the eyes.  Pressure in the ears or face.  How is this treated?  URIs usually get better on their own within 7-10 days. Medicines cannot cure URIs, but your doctor may recommend certain medicines to help relieve symptoms, such as:  Over-the-counter cold medicines.  Medicines to reduce coughing (cough suppressants). Coughing is a type of defense against infection that helps to clear the nose, throat, windpipe, and lungs (respiratory system). Take these medicines only as told by your doctor.  Medicines to lower your fever.  Follow these instructions at home:  Activity  Rest as needed.  If you have a fever, stay home from work or school until your fever is gone, or until your doctor says you may return to work or school.  You should stay home until you cannot spread the infection anymore (you are not contagious).  Your doctor may have you wear a face mask so you have less risk of spreading the infection.  Relieving symptoms  Rinse your mouth often with salt water. To make salt water, dissolve  ½-1 tsp (3-6 g) of salt in 1 cup (237 mL) of warm water.  Use a cool-mist humidifier to add moisture to the air. This can help you breathe more easily.  Eating and drinking    Drink enough fluid to keep your pee (urine) pale yellow.  Eat soups and other clear broths.  General instructions    Take over-the-counter and prescription medicines only as told by your doctor.  Do not smoke or use any products that contain nicotine or tobacco. If you need help quitting, ask your doctor.  Avoid being where people are smoking (avoid secondhand smoke).  Stay up to date on all your shots (immunizations), and get the flu shot every year.  Keep all follow-up visits.  How to prevent the spread of infection to others    Wash your hands with soap and water for at least 20 seconds. If you cannot use soap and water, use hand .  Avoid touching your mouth, face, eyes, or nose.  Cough or sneeze into a tissue or your sleeve or elbow. Do not cough or sneeze into your hand or into the air.  Contact a doctor if:  You are getting worse, not better.  You have any of these:  A fever or chills.  Brown or red mucus in your nose.  Yellow or brown fluid (discharge)coming from your nose.  Pain in your face, especially when you bend forward.  Swollen neck glands.  Pain when you swallow.  White areas in the back of your throat.  Get help right away if:  You have shortness of breath that gets worse.  You have very bad or constant:  Headache.  Ear pain.  Pain in your forehead, behind your eyes, and over your cheekbones (sinus pain).  Chest pain.  You have long-lasting (chronic) lung disease along with any of these:  Making high-pitched whistling sounds when you breathe, most often when you breathe out (wheezing).  Long-lasting cough (more than 14 days).  Coughing up blood.  A change in your usual mucus.  You have a stiff neck.  You have changes in your:  Vision.  Hearing.  Thinking.  Mood.  These symptoms may be an emergency. Get help right  away. Call 911.  Do not wait to see if the symptoms will go away.  Do not drive yourself to the hospital.  Summary  An upper respiratory infection (URI) is caused by a germ (virus). The most common type of URI is often called the common cold.  URIs usually get better within 7-10 days.  Take over-the-counter and prescription medicines only as told by your doctor.  This information is not intended to replace advice given to you by your health care provider. Make sure you discuss any questions you have with your health care provider.  Document Revised: 07/20/2022 Document Reviewed: 07/20/2022

## 2025-02-21 ENCOUNTER — OFFICE VISIT (OUTPATIENT)
Dept: INTERNAL MEDICINE | Facility: CLINIC | Age: 30
End: 2025-02-21
Payer: COMMERCIAL

## 2025-02-21 VITALS
HEART RATE: 64 BPM | OXYGEN SATURATION: 98 % | DIASTOLIC BLOOD PRESSURE: 71 MMHG | HEIGHT: 70 IN | SYSTOLIC BLOOD PRESSURE: 116 MMHG | BODY MASS INDEX: 30.49 KG/M2 | WEIGHT: 213 LBS | TEMPERATURE: 98.5 F

## 2025-02-21 DIAGNOSIS — L98.9 SKIN LESION OF FOOT: ICD-10-CM

## 2025-02-21 DIAGNOSIS — Z00.00 ANNUAL PHYSICAL EXAM: Primary | ICD-10-CM

## 2025-02-21 PROCEDURE — 99395 PREV VISIT EST AGE 18-39: CPT

## 2025-02-21 NOTE — PATIENT INSTRUCTIONS
Continue medications as prescribed. Stay active. Stay hydrated with water. Referral for dermatology placed. Scheduling center to call with appointment.  Follow-up in a year for annual and fasting labs.

## 2025-02-21 NOTE — PROGRESS NOTES
"Chief Complaint  Annual Exam    Subjective        Elliot Bhat presents to [facility]  History of Present Illness  29-year-old male presenting for annual exam.  Sees Dr. Saldana for ADHD.  Taking Adderall XR and Adderall IR as prescribed.  Has noticed some chest tightness when taking a particular manufacturers version of Adderall XR.  Chest tightness last 6 to 7 hours.  He is changing pharmacies in order to get a different manufacturers version of medication in the future.  Next appointment in March.    Has a dark spot on the tip of his right second toe.  States it has been there for about a year.  Is unsure if it has gotten larger or not.  Denies any pain from the lesion.  Has other skin spots along his skin especially on his back.  Recommended dermatology to further evaluate.     Denies chest pain, difficulty breathing, swelling of hands or feet, constipation, dysuria and changes in vision or hearing.        Objective   Vital Signs:  /71 (BP Location: Left arm, Patient Position: Sitting, Cuff Size: Adult)   Pulse 64   Temp 98.5 °F (36.9 °C) (Temporal)   Ht 177.8 cm (70\")   Wt 96.6 kg (213 lb)   SpO2 98%   BMI 30.56 kg/m²   Estimated body mass index is 30.56 kg/m² as calculated from the following:    Height as of this encounter: 177.8 cm (70\").    Weight as of this encounter: 96.6 kg (213 lb).               Physical Exam  Vitals reviewed.   Constitutional:       Appearance: Normal appearance.   HENT:      Head: Normocephalic.      Right Ear: Tympanic membrane normal.      Left Ear: Tympanic membrane normal.      Nose: Nose normal.      Mouth/Throat:      Mouth: Mucous membranes are moist.      Pharynx: Oropharynx is clear.   Eyes:      Pupils: Pupils are equal, round, and reactive to light.   Cardiovascular:      Rate and Rhythm: Normal rate.      Pulses: Normal pulses.      Heart sounds: Normal heart sounds.   Pulmonary:      Effort: Pulmonary effort is normal.      " Breath sounds: Normal breath sounds.   Abdominal:      General: Bowel sounds are normal.      Palpations: Abdomen is soft.   Musculoskeletal:         General: Normal range of motion.      Cervical back: Normal range of motion.   Skin:     General: Skin is warm and dry.      Capillary Refill: Capillary refill takes less than 2 seconds.      Findings: Lesion present.   Neurological:      General: No focal deficit present.      Mental Status: He is alert and oriented to person, place, and time.   Psychiatric:         Mood and Affect: Mood normal.         Behavior: Behavior normal.         Thought Content: Thought content normal.         Judgment: Judgment normal.        Result Review :      Common labs          11/13/2024    10:19   Common Labs   Glucose 87    BUN 12    Creatinine 0.89    Sodium 138    Potassium 4.2    Chloride 103    Calcium 9.2    Albumin 4.4    Total Bilirubin 0.8    Alkaline Phosphatase 51    AST (SGOT) 24    ALT (SGPT) 40    WBC 5.36    Hemoglobin 15.1    Hematocrit 44.0    Platelets 414    Total Cholesterol 122    Triglycerides 65    HDL Cholesterol 31    LDL Cholesterol  77          Current Outpatient Medications on File Prior to Visit   Medication Sig Dispense Refill    Adderall 20 MG tablet       amphetamine-dextroamphetamine XR (ADDERALL XR) 25 MG 24 hr capsule 1 capsule      [DISCONTINUED] azithromycin (ZITHROMAX) 250 MG tablet Take 2 tablets the first day, then 1 tablet daily for 4 days. 6 tablet 0    [DISCONTINUED] methylPREDNISolone (MEDROL) 4 MG dose pack Take as directed on package instructions. 21 tablet 0     No current facility-administered medications on file prior to visit.                Assessment and Plan     Diagnoses and all orders for this visit:    1. Annual physical exam (Primary)    2. Skin lesion of foot  -     Ambulatory Referral to Dermatology        Patient Instructions   Continue medications as prescribed. Stay active. Stay hydrated with water. Referral for  dermatology placed. Scheduling center to call with appointment.  Follow-up in a year for annual and fasting labs.     The patient was counseled regarding nutrition, physical activity, healthy weight, injury prevention, misuse of tobacco, alcohol and illicit drugs, mental health, immunizations, and screenings.         Follow Up     Return in about 1 year (around 2/21/2026) for Annual physical.  Patient was given instructions and counseling regarding his condition or for health maintenance advice. Please see specific information pulled into the AVS if appropriate.